# Patient Record
(demographics unavailable — no encounter records)

---

## 2024-12-24 NOTE — PHYSICAL EXAM
[Midline] : trachea located in midline position [Normal] : no rashes [de-identified] : Post treatment changes [de-identified] :  ASIM

## 2024-12-24 NOTE — HISTORY OF PRESENT ILLNESS
[de-identified] : 70 yro male pt is s/p CXRT for Tonsil cancer. Completed treatment in July 2020.    CT neck 6-15-23: Post treatment changes, no new mass or lymphadenopathy. CT chest 6-15-23: Stable very small nodule in the lingular when compared to previous exam. Will have new CTs on 12/26/24  Pt has been unable to see ENT in the last year.  Pt intermittent L neck cramping and soreness since having treatment. Difficulty swallowing dry foods and meats. Pt has developed osteoarthritis and carpal tunnel.

## 2025-01-03 NOTE — HISTORY OF PRESENT ILLNESS
[T: ___] : T[unfilled] [N: ___] : N[unfilled] [M: ___] : M[unfilled] [de-identified] : The patient was f/w a left tonsil mass in April 2020 at the age of 65.  He saw ENT, Dr. Carson Naranjo, for a left neck mass present since February.  Dr. Naranjo performed a laryngoscopy which revealed a large left tonsil mass.  PET and CT neck showed a left tonsillar mass with protrusion into the oral cavity, does not appear to cross the midline, SUV 14.6.  Multiple hypermetabolic left level II nodes, with findings suggestive of hemorrhage or necrosis medially (SUV 8.3).  Similar nodes are seen inferiorly to level III/IV which also demonstrates increased activity out  of proportion to their otherwise small size, SUV 4.2.  Contralateral side appears unremarkable.  No gross evidence of distant metastatic disease.  He next saw Dr. Ramone Bautista who performed a laryngoscopy which showed a large L. tonsil mass filling approximately 2/3 of the OPx with involvement of the GT sulcus and BOT. No involvement of the larynx. VC are mobile, airway patent.  An FNA of the level II lymph node was negative. \par  \par  S/p C6 CRT with weekly Cisplatin for L tonsil SCC, completed on 7/31/20.\par  PET on Nov 10, 2020 showing complete response in the primary and neck node. 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 15 pack year history. Occasional alcohol use.  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 [de-identified] : Patient presents for follow up, completed treatment July 2020 Patient has not been seen in office since August 2023  Patient currently lives in [facility], has not seen Oncology since August 2023 Reports occasion trouble swallowing bread and other dry foods Intermittent Cramping/spasms in the neck, stable Has severe carpel tunnel, [place]  Patient saw Dr. Mcclendon on 12/24/24, Laryngoscopy- base of tongue and vallecula clear, hypopharynx normal without pooled secretions, crisp epiglottis, no evidence of laryngomalacia, bilateral vocal fold mobility full and symmetric, no significant arytenoids edema or erythema, and no mass or lesions. Currently on levothyroxine per PCP: Dr. Miri Up  12/26/2024 CT CAP/Neck  Stable interval follow-up CT examination without evidence of neoplastic disease progression nor recurrence. No new or enlarging cervical lymph nodes. Indeterminate 1.2 cm hyperdense lesion in the dome of the liver. Further evaluation with MRI with and without contrast may be of benefit to

## 2025-01-03 NOTE — BEGINNING OF VISIT
[0] : 2) Feeling down, depressed, or hopeless: Not at all (0) [PHQ-2 Negative] : PHQ-2 Negative [Pain Scale: ___] : On a scale of 1-10, today the patient's pain is a(n) [unfilled]. [Referred to Palliative/Pain Management] : Referred to Palliative/Pain Management [Former] : Former [Date Discussed (MM/DD/YY): ___] : Discussed: [unfilled] Pfizer dose 1 and 2

## 2025-01-03 NOTE — ASSESSMENT
[FreeTextEntry1] : Tonsil cancer (146.0) (C09.9)   SCC left tonsillar mass with ipsilateral adenopathy p16+     -4/28/20 laryngoscopy: large L. tonsil mass filling approximately 2/3 of the OPx with     involvement of the GT sulcus and BOT. No involvement of the larynx.     -biopsy positive for metastatic squamous cell carcinoma, positive for p16 and focally     positive for HPV 16/18. .     -s/p hearing test. He has hx of parital hearing loss in the left ear and aware hearing     loss can be exacerbated with cisplatin. Dr. Nichols ENT.     -s/p 6 cycles CRT with weekly Cisplatin, completed on 7/31/20     -10/2021 CT: ASIM     -follow up GI re: intermittent rectal bleeding     -continue follow up with Dr. Cast - Patient having neck spasms. 6/15/23 CT Neck Posttreatment changes. No new mass or cervical lymphadenopathy.   6/15/23 CT Chest 0.2 cm nodule in the lingula is unchanged. Heart is enlarged in size. Calcification of the coronary arteries is noted. No pericardial effusion is noted. Advised patient to follow up with cardiologist.  -Patient currently lives in [facility], has not seen Oncology since August 2023 -saw Dr. Mcclendon on 12/24/24, Laryngoscopy- base of tongue and vallecula clear, hypopharynx normal without pooled secretions, crisp epiglottis, no evidence of laryngomalacia, bilateral vocal fold mobility full and symmetric, no significant arytenoids edema or erythema, and no mass or lesions. -Currently on levothyroxine per PCP: Dr. Miri Up -12/26/2024 CT CAP/Neck  Stable interval follow-up CT examination without evidence of neoplastic disease progression nor recurrence. No new or enlarging cervical lymph nodes. Indeterminate 1.2 cm hyperdense lesion in the dome of the liver. Further evaluation with MRI with and without contrast may be of benefit to - MR Abd ordered, patient advised to have imaging at [facility]. Patient refuses stating he has to go hieu [facility]. He reports he will have it done up there - Discussed the importance of continuity of surveillance/care. Patient advised again to establish care with oncology in [facility] - IF patient decides to continue surveillance with [facility], patient should f/u with Dr. Recinos in 3 months

## 2025-01-03 NOTE — BEGINNING OF VISIT
[0] : 2) Feeling down, depressed, or hopeless: Not at all (0) [PHQ-2 Negative] : PHQ-2 Negative [Pain Scale: ___] : On a scale of 1-10, today the patient's pain is a(n) [unfilled]. [Referred to Palliative/Pain Management] : Referred to Palliative/Pain Management [Former] : Former [Date Discussed (MM/DD/YY): ___] : Discussed: [unfilled]

## 2025-01-03 NOTE — PHYSICAL EXAM
[Normal] : affect appropriate [de-identified] : ? hemangioma to left lower lip. [de-identified] : no lymphadenopathy  [de-identified] : chronic tremors

## 2025-01-03 NOTE — ASSESSMENT
[FreeTextEntry1] : Tonsil cancer (146.0) (C09.9)   SCC left tonsillar mass with ipsilateral adenopathy p16+     -4/28/20 laryngoscopy: large L. tonsil mass filling approximately 2/3 of the OPx with     involvement of the GT sulcus and BOT. No involvement of the larynx.     -biopsy positive for metastatic squamous cell carcinoma, positive for p16 and focally     positive for HPV 16/18. .     -s/p hearing test. He has hx of parital hearing loss in the left ear and aware hearing     loss can be exacerbated with cisplatin. Dr. Nichols ENT.     -s/p 6 cycles CRT with weekly Cisplatin, completed on 7/31/20     -10/2021 CT: ASIM     -follow up GI re: intermittent rectal bleeding     -continue follow up with Dr. Cast - Patient having neck spasms. 6/15/23 CT Neck Posttreatment changes. No new mass or cervical lymphadenopathy.   6/15/23 CT Chest 0.2 cm nodule in the lingula is unchanged. Heart is enlarged in size. Calcification of the coronary arteries is noted. No pericardial effusion is noted. Advised patient to follow up with cardiologist.  -Patient currently lives in [facility], has not seen Oncology since August 2023 -saw Dr. Mcclendon on 12/24/24, Laryngoscopy- base of tongue and vallecula clear, hypopharynx normal without pooled secretions, crisp epiglottis, no evidence of laryngomalacia, bilateral vocal fold mobility full and symmetric, no significant arytenoids edema or erythema, and no mass or lesions. -Currently on levothyroxine per PCP: Dr. Miri Up -12/26/2024 CT CAP/Neck  Stable interval follow-up CT examination without evidence of neoplastic disease progression nor recurrence. No new or enlarging cervical lymph nodes. Indeterminate 1.2 cm hyperdense lesion in the dome of the liver. Further evaluation with MRI with and without contrast may be of benefit to - MR Abd ordered, patient advised to have imaging at [facility]. Patient refuses stating he has to go hieu [facility]. He reports he will have it done up there - Discussed the importance of continuity of surveillance/care. Patient advised again to establish care with oncology in [facility] - IF patient decides to continue surveillance with [facility], patient should f/u with Dr. Recinos in 3 months

## 2025-01-03 NOTE — PHYSICAL EXAM
[Normal] : affect appropriate [de-identified] : ? hemangioma to left lower lip. [de-identified] : no lymphadenopathy  [de-identified] : chronic tremors

## 2025-01-03 NOTE — HISTORY OF PRESENT ILLNESS
[T: ___] : T[unfilled] [N: ___] : N[unfilled] [M: ___] : M[unfilled] [de-identified] : The patient was f/w a left tonsil mass in April 2020 at the age of 65.  He saw ENT, Dr. Carson Naranjo, for a left neck mass present since February.  Dr. Naranjo performed a laryngoscopy which revealed a large left tonsil mass.  PET and CT neck showed a left tonsillar mass with protrusion into the oral cavity, does not appear to cross the midline, SUV 14.6.  Multiple hypermetabolic left level II nodes, with findings suggestive of hemorrhage or necrosis medially (SUV 8.3).  Similar nodes are seen inferiorly to level III/IV which also demonstrates increased activity out  of proportion to their otherwise small size, SUV 4.2.  Contralateral side appears unremarkable.  No gross evidence of distant metastatic disease.  He next saw Dr. Ramone Bautista who performed a laryngoscopy which showed a large L. tonsil mass filling approximately 2/3 of the OPx with involvement of the GT sulcus and BOT. No involvement of the larynx. VC are mobile, airway patent.  An FNA of the level II lymph node was negative. \par  \par  S/p C6 CRT with weekly Cisplatin for L tonsil SCC, completed on 7/31/20.\par  PET on Nov 10, 2020 showing complete response in the primary and neck node. 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 15 pack year history. Occasional alcohol use.  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 [de-identified] : Patient presents for follow up, completed treatment July 2020 Patient has not been seen in office since August 2023  Patient currently lives in [facility], has not seen Oncology since August 2023 Reports occasion trouble swallowing bread and other dry foods Intermittent Cramping/spasms in the neck, stable Has severe carpel tunnel, [place]  Patient saw Dr. Mcclendon on 12/24/24, Laryngoscopy- base of tongue and vallecula clear, hypopharynx normal without pooled secretions, crisp epiglottis, no evidence of laryngomalacia, bilateral vocal fold mobility full and symmetric, no significant arytenoids edema or erythema, and no mass or lesions. Currently on levothyroxine per PCP: Dr. Miri Up  12/26/2024 CT CAP/Neck  Stable interval follow-up CT examination without evidence of neoplastic disease progression nor recurrence. No new or enlarging cervical lymph nodes. Indeterminate 1.2 cm hyperdense lesion in the dome of the liver. Further evaluation with MRI with and without contrast may be of benefit to

## 2025-01-03 NOTE — HISTORY OF PRESENT ILLNESS
[T: ___] : T[unfilled] [N: ___] : N[unfilled] [M: ___] : M[unfilled] [de-identified] : The patient was f/w a left tonsil mass in April 2020 at the age of 65.  He saw ENT, Dr. Carson Naranjo, for a left neck mass present since February.  Dr. Naranjo performed a laryngoscopy which revealed a large left tonsil mass.  PET and CT neck showed a left tonsillar mass with protrusion into the oral cavity, does not appear to cross the midline, SUV 14.6.  Multiple hypermetabolic left level II nodes, with findings suggestive of hemorrhage or necrosis medially (SUV 8.3).  Similar nodes are seen inferiorly to level III/IV which also demonstrates increased activity out  of proportion to their otherwise small size, SUV 4.2.  Contralateral side appears unremarkable.  No gross evidence of distant metastatic disease.  He next saw Dr. Ramone Bautista who performed a laryngoscopy which showed a large L. tonsil mass filling approximately 2/3 of the OPx with involvement of the GT sulcus and BOT. No involvement of the larynx. VC are mobile, airway patent.  An FNA of the level II lymph node was negative. \par  \par  S/p C6 CRT with weekly Cisplatin for L tonsil SCC, completed on 7/31/20.\par  PET on Nov 10, 2020 showing complete response in the primary and neck node. 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 15 pack year history. Occasional alcohol use.  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 [de-identified] : Patient presents for follow up, completed treatment July 2020 Patient has not been seen in office since August 2023  Patient currently lives in [facility], has not seen Oncology since August 2023 Reports occasion trouble swallowing bread and other dry foods Intermittent Cramping/spasms in the neck, stable Has severe carpel tunnel, [place]  Patient saw Dr. Mcclendon on 12/24/24, Laryngoscopy- base of tongue and vallecula clear, hypopharynx normal without pooled secretions, crisp epiglottis, no evidence of laryngomalacia, bilateral vocal fold mobility full and symmetric, no significant arytenoids edema or erythema, and no mass or lesions. Currently on levothyroxine per PCP: Dr. Miri Up  12/26/2024 CT CAP/Neck  Stable interval follow-up CT examination without evidence of neoplastic disease progression nor recurrence. No new or enlarging cervical lymph nodes. Indeterminate 1.2 cm hyperdense lesion in the dome of the liver. Further evaluation with MRI with and without contrast may be of benefit to

## 2025-01-03 NOTE — ASSESSMENT
[FreeTextEntry1] : Tonsil cancer (146.0) (C09.9)   SCC left tonsillar mass with ipsilateral adenopathy p16+     -4/28/20 laryngoscopy: large L. tonsil mass filling approximately 2/3 of the OPx with     involvement of the GT sulcus and BOT. No involvement of the larynx.     -biopsy positive for metastatic squamous cell carcinoma, positive for p16 and focally     positive for HPV 16/18. .     -s/p hearing test. He has hx of parital hearing loss in the left ear and aware hearing     loss can be exacerbated with cisplatin. Dr. Nichols ENT.     -s/p 6 cycles CRT with weekly Cisplatin, completed on 7/31/20     -10/2021 CT: ASIM     -follow up GI re: intermittent rectal bleeding     -continue follow up with Dr. Cast - Patient having neck spasms. 6/15/23 CT Neck Posttreatment changes. No new mass or cervical lymphadenopathy.   6/15/23 CT Chest 0.2 cm nodule in the lingula is unchanged. Heart is enlarged in size. Calcification of the coronary arteries is noted. No pericardial effusion is noted. Advised patient to follow up with cardiologist.  -Patient currently lives in [facility], has not seen Oncology since August 2023 -saw Dr. Mcclendon on 12/24/24, Laryngoscopy- base of tongue and vallecula clear, hypopharynx normal without pooled secretions, crisp epiglottis, no evidence of laryngomalacia, bilateral vocal fold mobility full and symmetric, no significant arytenoids edema or erythema, and no mass or lesions. -Currently on levothyroxine per PCP: Dr. Miri Up -12/26/2024 CT CAP/Neck  Stable interval follow-up CT examination without evidence of neoplastic disease progression nor recurrence. No new or enlarging cervical lymph nodes. Indeterminate 1.2 cm hyperdense lesion in the dome of the liver. Further evaluation with MRI with and without contrast may be of benefit to - MR Abd ordered, patient advised to have imaging at [facility]. Patient refuses stating he has to go hieu [facility]. He reports he will have it done up there - Discussed the importance of continuity of surveillance/care. Patient advised again to establish care with oncology in [facility] - IF patient decides to continue surveillance with [facility], patient should f/u with Dr. Recinos in 3 months

## 2025-01-03 NOTE — HISTORY OF PRESENT ILLNESS
[T: ___] : T[unfilled] [N: ___] : N[unfilled] [M: ___] : M[unfilled] [de-identified] : The patient was f/w a left tonsil mass in April 2020 at the age of 65.  He saw ENT, Dr. Carson Naranjo, for a left neck mass present since February.  Dr. Naranjo performed a laryngoscopy which revealed a large left tonsil mass.  PET and CT neck showed a left tonsillar mass with protrusion into the oral cavity, does not appear to cross the midline, SUV 14.6.  Multiple hypermetabolic left level II nodes, with findings suggestive of hemorrhage or necrosis medially (SUV 8.3).  Similar nodes are seen inferiorly to level III/IV which also demonstrates increased activity out  of proportion to their otherwise small size, SUV 4.2.  Contralateral side appears unremarkable.  No gross evidence of distant metastatic disease.  He next saw Dr. Ramone Bautista who performed a laryngoscopy which showed a large L. tonsil mass filling approximately 2/3 of the OPx with involvement of the GT sulcus and BOT. No involvement of the larynx. VC are mobile, airway patent.  An FNA of the level II lymph node was negative. \par  \par  S/p C6 CRT with weekly Cisplatin for L tonsil SCC, completed on 7/31/20.\par  PET on Nov 10, 2020 showing complete response in the primary and neck node. 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 15 pack year history. Occasional alcohol use.  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 [de-identified] : Patient presents for follow up, completed treatment July 2020 Patient has not been seen in office since August 2023  Patient currently lives in [facility], has not seen Oncology since August 2023 Reports occasion trouble swallowing bread and other dry foods Intermittent Cramping/spasms in the neck, stable Has severe carpel tunnel, [place] specialist [place] the city  Patient saw Dr. Mcclendon on 12/24/24, Laryngoscopy- base of tongue and vallecula clear, hypopharynx normal without pooled secretions, crisp epiglottis, no evidence of laryngomalacia, bilateral vocal fold mobility full and symmetric, no significant arytenoids edema or erythema, and no mass or lesions. Currently on levothyroxine per PCP: Dr. Miri Up  12/26/2024 CT CAP/Neck  Stable interval follow-up CT examination without evidence of neoplastic disease progression nor recurrence. No new or enlarging cervical lymph nodes. Indeterminate 1.2 cm hyperdense lesion in the dome of the liver. Further evaluation with MRI with and without contrast may be of benefit to

## 2025-01-03 NOTE — PHYSICAL EXAM
[Normal] : affect appropriate [de-identified] : ? hemangioma to left lower lip. [de-identified] : no lymphadenopathy  [de-identified] : chronic tremors

## 2025-01-03 NOTE — PHYSICAL EXAM
[Normal] : affect appropriate [de-identified] : ? hemangioma to left lower lip. [de-identified] : no lymphadenopathy  [de-identified] : chronic tremors

## 2025-01-03 NOTE — ASSESSMENT
[FreeTextEntry1] : Tonsil cancer (146.0) (C09.9)   SCC left tonsillar mass with ipsilateral adenopathy p16+     -4/28/20 laryngoscopy: large L. tonsil mass filling approximately 2/3 of the OPx with     involvement of the GT sulcus and BOT. No involvement of the larynx.     -biopsy positive for metastatic squamous cell carcinoma, positive for p16 and focally     positive for HPV 16/18. .     -s/p hearing test. He has hx of parital hearing loss in the left ear and aware hearing     loss can be exacerbated with cisplatin. Dr. Nichols ENT.     -s/p 6 cycles CRT with weekly Cisplatin, completed on 7/31/20     -10/2021 CT: ASIM     -follow up GI re: intermittent rectal bleeding     -continue follow up with Dr. Cast - Patient having neck spasms. 6/15/23 CT Neck Posttreatment changes. No new mass or cervical lymphadenopathy.   6/15/23 CT Chest 0.2 cm nodule in the lingula is unchanged. Heart is enlarged in size. Calcification of the coronary arteries is noted. No pericardial effusion is noted. Advised patient to follow up with cardiologist.  -Patient currently lives in [facility], has not seen Oncology since August 2023 -saw Dr. Mcclendon on 12/24/24, Laryngoscopy- base of tongue and vallecula clear, hypopharynx normal without pooled secretions, crisp epiglottis, no evidence of laryngomalacia, bilateral vocal fold mobility full and symmetric, no significant arytenoids edema or erythema, and no mass or lesions. -Currently on levothyroxine per PCP: Dr. Miri Up -12/26/2024 CT CAP/Neck  Stable interval follow-up CT examination without evidence of neoplastic disease progression nor recurrence. No new or enlarging cervical lymph nodes. Indeterminate 1.2 cm hyperdense lesion in the dome of the liver. Further evaluation with MRI with and without contrast may be of benefit to - MR Abd ordered, patient advised to have imaging at [facility]. Patient refuses stating he has to go hieu [facility]. He reports he will have it done up there - Discussed the importance of continuity of surveillance/care. Patient advised again to establish care with oncology in [facility] - IF patient decides to continue surveillance with [facility], patient should f/u with Dr. Recinos in 3 months